# Patient Record
Sex: MALE | Race: WHITE | Employment: OTHER | URBAN - METROPOLITAN AREA
[De-identification: names, ages, dates, MRNs, and addresses within clinical notes are randomized per-mention and may not be internally consistent; named-entity substitution may affect disease eponyms.]

---

## 2018-04-05 PROBLEM — E78.2 HYPERLIPIDEMIA, MIXED: Chronic | Status: ACTIVE | Noted: 2018-04-05

## 2018-04-05 PROBLEM — E78.2 HYPERLIPIDEMIA, MIXED: Status: ACTIVE | Noted: 2018-04-05

## 2019-11-18 ENCOUNTER — HOSPITAL ENCOUNTER (OUTPATIENT)
Dept: OCCUPATIONAL MEDICINE | Age: 69
Discharge: HOME | End: 2019-11-18

## 2019-11-18 DIAGNOSIS — Z00.8 HEALTH EXAMINATION IN POPULATION SURVEY: ICD-10-CM

## 2022-03-20 PROBLEM — E78.2 HYPERLIPIDEMIA, MIXED: Status: ACTIVE | Noted: 2018-04-05

## 2023-02-02 ENCOUNTER — OFFICE VISIT (OUTPATIENT)
Dept: ORTHOPEDIC SURGERY | Age: 73
End: 2023-02-02

## 2023-02-02 DIAGNOSIS — Z96.653 PRESENCE OF ARTIFICIAL KNEE JOINT, BILATERAL: Primary | ICD-10-CM

## 2023-02-02 DIAGNOSIS — M79.605 PAIN IN LEFT LEG: ICD-10-CM

## 2023-02-02 RX ORDER — VALSARTAN 40 MG/1
40 TABLET ORAL DAILY
COMMUNITY

## 2023-02-02 RX ORDER — ASPIRIN 81 MG/1
81 TABLET ORAL DAILY
COMMUNITY

## 2023-02-02 RX ORDER — ROSUVASTATIN CALCIUM 10 MG/1
10 TABLET, COATED ORAL DAILY
COMMUNITY

## 2023-02-02 RX ORDER — NAPROXEN SODIUM 220 MG
220 TABLET ORAL 2 TIMES DAILY WITH MEALS
COMMUNITY

## 2023-02-02 RX ORDER — M-VIT,TX,IRON,MINS/CALC/FOLIC 27MG-0.4MG
1 TABLET ORAL DAILY
COMMUNITY

## 2023-02-02 RX ORDER — DIPHENHYDRAMINE HYDROCHLORIDE 25 MG/1
TABLET ORAL DAILY
COMMUNITY

## 2023-02-02 RX ORDER — ASCORBIC ACID 500 MG
500 TABLET ORAL DAILY
COMMUNITY

## 2023-02-02 RX ORDER — CARVEDILOL 3.12 MG/1
3.12 TABLET ORAL 2 TIMES DAILY WITH MEALS
COMMUNITY

## 2023-02-02 NOTE — PROGRESS NOTES
Name: Avi Smith  YOB: 1950  Gender: male  MRN: [id number]    CC:   Chief Complaint   Patient presents with    Follow-up     Mirna Octavio tka     Knee Pain     Lt knee pain after fall         HPI: Avi Smith is a 67 y.o. male with a PMHx of bilateral total knee replacements in 2010 and 2011. They present here for evaluation of left knee pain. He reports that about a week ago he was walking through some woods [place] when his feet soft out from under him on some leaves, he fell on his back onto a log. He has pain along the posterior right side ribs that has been present ever since. It has gradually improved but is still bothersome with coughing and sneezing. No pain with normal breathing. At the time of his fall he fell backwards and hyperflexed his left knee. Since this incident he has had some pain along the superior pole of the patella and the lateral aspect of the knee. No locking or catching. No giving way. He says it is gradually improving over the last several days. He is not feeling significantly limited regarding his knees or his hips. He is not having any groin pain. Denies any radicular features or symptoms. .        Allergies   Allergen Reactions    Oxycodone Other (See Comments)     Headache         Review of Systems:  As per HPI. Pertinent positives and negatives are addressed with the patient, particularly those related to musculoskeletal concerns. Non-orthopaedic concerns were referred back to the primary care physician. PHYSICAL EXAMINATION:   The patient appears their stated age and they are in no distress. There were no vitals taken for this visit. The lower extremities are as described below. No distal edema, venous stasis changes, varicosities are present  Sensation is intact to light touch bilaterally. The gait is noted to be grossly normal  Knee Range of motion is 0 to 125 degrees bilaterally.   Incision to both knees are well-healed. No effusion of either knee. AP varus and valgus stressing of the bilateral knees reveal global stability with both knees, there is some noted laxity with varus stress of the left knee. ROM of hips is good and painless bilaterally  Limb lengths are equal.  Hip flexion is excellent. Their judgment and insight are normal.  They are oriented to situation. mood and affect appropriate. X-RAY: AP and lateral views of the left hip reveal generally preserved joint space. No significant osteophyte formation. There is a cystic appearing structure in the inferior aspect of the femoral head. Some subchondral sclerosis is noted. AP lateral sunrise views of the bilateral knees reveal uncemented bilateral total knee arthroplasty with patella arthroplasties. Components appear well fixed no obvious fractures. IMPRESSION: Mild osteoporotic the left hip, stable bilateral total knee arthroplasties    RECOMMENDATIONS:    Reviewed x-ray findings with the patient. The patient has no obvious injury to either knee. He does have some lateral laxity of his left knee which has been present for some time. He has moderate arthropathy of the left hip with no symptoms. I have recommended observation at this point in time. He is to return here in 3 to 4 years for follow-up of both knees unless he has continuing discomfort and pain in his left leg.       3--this is an acute, uncomplicated illness/injury